# Patient Record
Sex: FEMALE | Race: WHITE | NOT HISPANIC OR LATINO | URBAN - METROPOLITAN AREA
[De-identification: names, ages, dates, MRNs, and addresses within clinical notes are randomized per-mention and may not be internally consistent; named-entity substitution may affect disease eponyms.]

---

## 2023-03-22 RX ORDER — ESCITALOPRAM OXALATE 10 MG/1
10 TABLET ORAL DAILY
COMMUNITY

## 2023-03-22 RX ORDER — MULTIVIT-MIN/IRON/FOLIC/HRB186 3.3 MG-25
TABLET ORAL
COMMUNITY

## 2023-03-28 ENCOUNTER — ANESTHESIA EVENT (OUTPATIENT)
Dept: SURGERY | Facility: AMBULATORY SURGERY CENTER | Age: 38
End: 2023-03-28

## 2023-03-28 NOTE — ANESTHESIA PREPROCEDURE EVALUATION
Anesthesia Pre-Procedure Evaluation    Patient: Arianna Owen   MRN: [id number] : 1985        Procedure : Procedure(s):  BREAST AUGMENTATION          History reviewed. No pertinent past medical history.   Past Surgical History:   Procedure Laterality Date     GYN SURGERY       ORTHOPEDIC SURGERY       WISDOM TOOTH EXTRACTION        No Known Allergies   Social History     Tobacco Use     Smoking status: Some Days     Types: Cigarettes     Smokeless tobacco: Never   Substance Use Topics     Alcohol use: Yes     Comment: weekends      Wt Readings from Last 1 Encounters:   23 59 kg (130 lb)           Physical Exam    Airway        Mallampati: I   TM distance: > 3 FB   Neck ROM: full   Mouth opening: > 3 cm    Respiratory Devices and Support         Dental           Cardiovascular   cardiovascular exam normal          Pulmonary   pulmonary exam normal                OUTSIDE LABS:  CBC: No results found for: WBC, HGB, HCT, PLT  BMP: No results found for: NA, POTASSIUM, CHLORIDE, CO2, BUN, CR, GLC  COAGS: No results found for: PTT, INR, FIBR  POC: No results found for: BGM, HCG, HCGS  HEPATIC: No results found for: ALBUMIN, PROTTOTAL, ALT, AST, GGT, ALKPHOS, BILITOTAL, BILIDIRECT, FRANK  OTHER: No results found for: PH, LACT, A1C, SAEID, PHOS, MAG, LIPASE, AMYLASE, TSH, T4, T3, CRP, SED    Anesthesia Plan    ASA Status:  1   NPO Status:  NPO Appropriate    Anesthesia Type: General.     - Airway: ETT   Induction: Intravenous, Propofol.   Maintenance: Balanced.        Consents    Anesthesia Plan(s) and associated risks, benefits, and realistic alternatives discussed. Questions answered and patient/representative(s) expressed understanding.    - Discussed:     - Discussed with:  Patient, Other (See Comment) (fiance)      - Extended Intubation/Ventilatory Support Discussed: No.      - Patient is DNR/DNI Status: No    Use of blood products discussed: No .     Postoperative Care    Pain management: IV analgesics, Oral  pain medications, Multi-modal analgesia.   PONV prophylaxis: Ondansetron (or other 5HT-3), Dexamethasone or Solumedrol, Background Propofol Infusion     Comments:                Jese Garber MD

## 2023-03-29 ENCOUNTER — HOSPITAL ENCOUNTER (OUTPATIENT)
Facility: AMBULATORY SURGERY CENTER | Age: 38
Discharge: HOME | End: 2023-03-29
Attending: PLASTIC SURGERY | Admitting: PLASTIC SURGERY

## 2023-03-29 ENCOUNTER — ANESTHESIA (OUTPATIENT)
Dept: SURGERY | Facility: AMBULATORY SURGERY CENTER | Age: 38
End: 2023-03-29

## 2023-03-29 VITALS
DIASTOLIC BLOOD PRESSURE: 88 MMHG | SYSTOLIC BLOOD PRESSURE: 135 MMHG | BODY MASS INDEX: 23.04 KG/M2 | TEMPERATURE: 97.4 F | HEART RATE: 70 BPM | RESPIRATION RATE: 13 BRPM | HEIGHT: 63 IN | WEIGHT: 130 LBS | OXYGEN SATURATION: 96 %

## 2023-03-29 DIAGNOSIS — R11.0 NAUSEA AFTER ANESTHESIA, INITIAL ENCOUNTER: ICD-10-CM

## 2023-03-29 DIAGNOSIS — T88.59XA NAUSEA AFTER ANESTHESIA, INITIAL ENCOUNTER: ICD-10-CM

## 2023-03-29 DIAGNOSIS — R52 PAIN: Primary | ICD-10-CM

## 2023-03-29 LAB — HCG UR QL: NEGATIVE

## 2023-03-29 PROCEDURE — G8916 PT W IV AB GIVEN ON TIME: HCPCS

## 2023-03-29 PROCEDURE — G8907 PT DOC NO EVENTS ON DISCHARG: HCPCS

## 2023-03-29 PROCEDURE — L8600 IMPLANT BREAST SILICONE/EQ: HCPCS

## 2023-03-29 PROCEDURE — 81025 URINE PREGNANCY TEST: CPT | Performed by: STUDENT IN AN ORGANIZED HEALTH CARE EDUCATION/TRAINING PROGRAM

## 2023-03-29 PROCEDURE — 360N000089 HC SURGERY LEVEL COSMETIC 120 MIN

## 2023-03-29 DEVICE — SILICONE GEL BREAST IMPLANT, SMOOTH ROUND, HIGH PROFILE, 440 CC
Type: IMPLANTABLE DEVICE | Site: BREAST | Status: FUNCTIONAL
Brand: SIENTRA SILICONE GEL BREAST IMPLANT

## 2023-03-29 RX ORDER — GLYCOPYRROLATE 0.2 MG/ML
INJECTION, SOLUTION INTRAMUSCULAR; INTRAVENOUS PRN
Status: DISCONTINUED | OUTPATIENT
Start: 2023-03-29 | End: 2023-03-29

## 2023-03-29 RX ORDER — HYDROXYZINE PAMOATE 25 MG/1
25 CAPSULE ORAL EVERY 6 HOURS PRN
Qty: 30 CAPSULE | Refills: 0
Start: 2023-03-29

## 2023-03-29 RX ORDER — ONDANSETRON 4 MG/1
4 TABLET, ORALLY DISINTEGRATING ORAL EVERY 8 HOURS PRN
Qty: 4 TABLET | Refills: 0
Start: 2023-03-29

## 2023-03-29 RX ORDER — LIDOCAINE HYDROCHLORIDE 20 MG/ML
INJECTION, SOLUTION INFILTRATION; PERINEURAL PRN
Status: DISCONTINUED | OUTPATIENT
Start: 2023-03-29 | End: 2023-03-29

## 2023-03-29 RX ORDER — ONDANSETRON 2 MG/ML
4 INJECTION INTRAMUSCULAR; INTRAVENOUS EVERY 30 MIN PRN
Status: DISCONTINUED | OUTPATIENT
Start: 2023-03-29 | End: 2023-03-30 | Stop reason: HOSPADM

## 2023-03-29 RX ORDER — PROPOFOL 10 MG/ML
INJECTION, EMULSION INTRAVENOUS CONTINUOUS PRN
Status: DISCONTINUED | OUTPATIENT
Start: 2023-03-29 | End: 2023-03-29

## 2023-03-29 RX ORDER — SODIUM CHLORIDE, SODIUM LACTATE, POTASSIUM CHLORIDE, CALCIUM CHLORIDE 600; 310; 30; 20 MG/100ML; MG/100ML; MG/100ML; MG/100ML
INJECTION, SOLUTION INTRAVENOUS CONTINUOUS
Status: DISCONTINUED | OUTPATIENT
Start: 2023-03-29 | End: 2023-03-30 | Stop reason: HOSPADM

## 2023-03-29 RX ORDER — LIDOCAINE 40 MG/G
CREAM TOPICAL
Status: DISCONTINUED | OUTPATIENT
Start: 2023-03-29 | End: 2023-03-30 | Stop reason: HOSPADM

## 2023-03-29 RX ORDER — DIAZEPAM 5 MG
10 TABLET ORAL EVERY 12 HOURS PRN
Status: DISCONTINUED | OUTPATIENT
Start: 2023-03-29 | End: 2023-03-30 | Stop reason: HOSPADM

## 2023-03-29 RX ORDER — ONDANSETRON 4 MG/1
4 TABLET, ORALLY DISINTEGRATING ORAL EVERY 30 MIN PRN
Status: DISCONTINUED | OUTPATIENT
Start: 2023-03-29 | End: 2023-03-30 | Stop reason: HOSPADM

## 2023-03-29 RX ORDER — GABAPENTIN 300 MG/1
300 CAPSULE ORAL
Status: COMPLETED | OUTPATIENT
Start: 2023-03-29 | End: 2023-03-29

## 2023-03-29 RX ORDER — OXYCODONE AND ACETAMINOPHEN 5; 325 MG/1; MG/1
2 TABLET ORAL
Status: DISCONTINUED | OUTPATIENT
Start: 2023-03-29 | End: 2023-03-30 | Stop reason: HOSPADM

## 2023-03-29 RX ORDER — FENTANYL CITRATE 50 UG/ML
INJECTION, SOLUTION INTRAMUSCULAR; INTRAVENOUS PRN
Status: DISCONTINUED | OUTPATIENT
Start: 2023-03-29 | End: 2023-03-29

## 2023-03-29 RX ORDER — VALACYCLOVIR HYDROCHLORIDE 1 G/1
2 TABLET, FILM COATED ORAL
COMMUNITY
Start: 2022-04-23

## 2023-03-29 RX ORDER — CEFAZOLIN SODIUM 2 G/100ML
2 INJECTION, SOLUTION INTRAVENOUS
Status: COMPLETED | OUTPATIENT
Start: 2023-03-29 | End: 2023-03-29

## 2023-03-29 RX ORDER — OXYCODONE HYDROCHLORIDE 5 MG/1
10 TABLET ORAL
Status: DISCONTINUED | OUTPATIENT
Start: 2023-03-29 | End: 2023-03-30 | Stop reason: HOSPADM

## 2023-03-29 RX ORDER — DEXAMETHASONE SODIUM PHOSPHATE 4 MG/ML
10 INJECTION, SOLUTION INTRA-ARTICULAR; INTRALESIONAL; INTRAMUSCULAR; INTRAVENOUS; SOFT TISSUE
Status: COMPLETED | OUTPATIENT
Start: 2023-03-29 | End: 2023-03-29

## 2023-03-29 RX ORDER — BUPIVACAINE HYDROCHLORIDE AND EPINEPHRINE 2.5; 5 MG/ML; UG/ML
INJECTION, SOLUTION INFILTRATION; PERINEURAL PRN
Status: DISCONTINUED | OUTPATIENT
Start: 2023-03-29 | End: 2023-03-29 | Stop reason: HOSPADM

## 2023-03-29 RX ORDER — OXYCODONE AND ACETAMINOPHEN 5; 325 MG/1; MG/1
1-2 TABLET ORAL EVERY 4 HOURS PRN
Status: DISCONTINUED | OUTPATIENT
Start: 2023-03-29 | End: 2023-03-30 | Stop reason: HOSPADM

## 2023-03-29 RX ORDER — ACETAMINOPHEN 325 MG/1
975 TABLET ORAL ONCE
Status: COMPLETED | OUTPATIENT
Start: 2023-03-29 | End: 2023-03-29

## 2023-03-29 RX ORDER — ONDANSETRON 2 MG/ML
INJECTION INTRAMUSCULAR; INTRAVENOUS PRN
Status: DISCONTINUED | OUTPATIENT
Start: 2023-03-29 | End: 2023-03-29

## 2023-03-29 RX ORDER — FENTANYL CITRATE 50 UG/ML
25 INJECTION, SOLUTION INTRAMUSCULAR; INTRAVENOUS EVERY 5 MIN PRN
Status: DISCONTINUED | OUTPATIENT
Start: 2023-03-29 | End: 2023-03-30 | Stop reason: HOSPADM

## 2023-03-29 RX ORDER — ACETAMINOPHEN 325 MG/1
325-650 TABLET ORAL EVERY 6 HOURS PRN
COMMUNITY

## 2023-03-29 RX ORDER — CEFADROXIL 500 MG/1
500 CAPSULE ORAL EVERY 12 HOURS SCHEDULED
Status: DISCONTINUED | OUTPATIENT
Start: 2023-03-29 | End: 2023-03-30 | Stop reason: HOSPADM

## 2023-03-29 RX ORDER — FENTANYL CITRATE 50 UG/ML
50 INJECTION, SOLUTION INTRAMUSCULAR; INTRAVENOUS EVERY 5 MIN PRN
Status: DISCONTINUED | OUTPATIENT
Start: 2023-03-29 | End: 2023-03-30 | Stop reason: HOSPADM

## 2023-03-29 RX ORDER — HYDROXYZINE HYDROCHLORIDE 25 MG/1
25 TABLET, FILM COATED ORAL
Status: DISCONTINUED | OUTPATIENT
Start: 2023-03-29 | End: 2023-03-30 | Stop reason: HOSPADM

## 2023-03-29 RX ORDER — ONDANSETRON 4 MG/1
4 TABLET, ORALLY DISINTEGRATING ORAL
Status: DISCONTINUED | OUTPATIENT
Start: 2023-03-29 | End: 2023-03-30 | Stop reason: HOSPADM

## 2023-03-29 RX ORDER — OXYCODONE HYDROCHLORIDE 5 MG/1
5 TABLET ORAL
Status: COMPLETED | OUTPATIENT
Start: 2023-03-29 | End: 2023-03-29

## 2023-03-29 RX ADMIN — GLYCOPYRROLATE 0.1 MG: 0.2 INJECTION, SOLUTION INTRAMUSCULAR; INTRAVENOUS at 07:59

## 2023-03-29 RX ADMIN — ACETAMINOPHEN 975 MG: 325 TABLET ORAL at 06:35

## 2023-03-29 RX ADMIN — OXYCODONE HYDROCHLORIDE 5 MG: 5 TABLET ORAL at 09:43

## 2023-03-29 RX ADMIN — DEXAMETHASONE SODIUM PHOSPHATE 10 MG: 4 INJECTION, SOLUTION INTRA-ARTICULAR; INTRALESIONAL; INTRAMUSCULAR; INTRAVENOUS; SOFT TISSUE at 07:44

## 2023-03-29 RX ADMIN — FENTANYL CITRATE 50 MCG: 50 INJECTION, SOLUTION INTRAMUSCULAR; INTRAVENOUS at 07:28

## 2023-03-29 RX ADMIN — PROPOFOL 20 MG: 10 INJECTION, EMULSION INTRAVENOUS at 09:18

## 2023-03-29 RX ADMIN — GABAPENTIN 300 MG: 300 CAPSULE ORAL at 06:35

## 2023-03-29 RX ADMIN — Medication 40 MG: at 07:28

## 2023-03-29 RX ADMIN — Medication 10 MG: at 08:07

## 2023-03-29 RX ADMIN — PROPOFOL 50 MCG/KG/MIN: 10 INJECTION, EMULSION INTRAVENOUS at 07:36

## 2023-03-29 RX ADMIN — GLYCOPYRROLATE 0.1 MG: 0.2 INJECTION, SOLUTION INTRAMUSCULAR; INTRAVENOUS at 08:04

## 2023-03-29 RX ADMIN — CEFAZOLIN SODIUM 2 G: 2 INJECTION, SOLUTION INTRAVENOUS at 07:23

## 2023-03-29 RX ADMIN — SODIUM CHLORIDE, SODIUM LACTATE, POTASSIUM CHLORIDE, CALCIUM CHLORIDE: 600; 310; 30; 20 INJECTION, SOLUTION INTRAVENOUS at 06:45

## 2023-03-29 RX ADMIN — ONDANSETRON 4 MG: 2 INJECTION INTRAMUSCULAR; INTRAVENOUS at 07:52

## 2023-03-29 RX ADMIN — Medication 10 MG: at 08:21

## 2023-03-29 RX ADMIN — FENTANYL CITRATE 50 MCG: 50 INJECTION, SOLUTION INTRAMUSCULAR; INTRAVENOUS at 07:47

## 2023-03-29 RX ADMIN — LIDOCAINE HYDROCHLORIDE 60 MG: 20 INJECTION, SOLUTION INFILTRATION; PERINEURAL at 07:28

## 2023-03-29 NOTE — ANESTHESIA PROCEDURE NOTES
Airway       Patient location during procedure: OR       Procedure Start/Stop Times: 3/29/2023 7:30 AM  Staff -        CRNA: Angela Spencer APRN CRNA       Performed By: CRNA  Consent for Airway        Urgency: elective  Indications and Patient Condition       Indications for airway management: castro-procedural       Induction type:intravenous       Mask difficulty assessment: 0 - not attempted    Final Airway Details       Final airway type: endotracheal airway       Successful airway: ETT - single  Endotracheal Airway Details        ETT size (mm): 6.5       Cuffed: yes       Successful intubation technique: direct laryngoscopy       DL Blade Type: Lynch 2       Grade View of Cords: 1       Adjucts: stylet       Position: Right       Measured from: lips       Secured at (cm): 21       Bite block used: None    Post intubation assessment        Placement verified by: capnometry, equal breath sounds and chest rise        Number of attempts at approach: 1       Secured with: pink tape       Ease of procedure: easy       Dentition: Intact    Medication(s) Administered   Medication Administration Time: 3/29/2023 7:30 AM

## 2023-03-29 NOTE — ANESTHESIA CARE TRANSFER NOTE
Patient: Arianna Owen    Procedure: Procedure(s):  BREAST AUGMENTATION       Diagnosis: Encounter for cosmetic surgery [Z41.1]  Diagnosis Additional Information: No value filed.    Anesthesia Type:   General     Note:    Oropharynx: oral airway in place  Level of Consciousness: drowsy  Oxygen Supplementation: nasal cannula  Level of Supplemental Oxygen (L/min / FiO2): 2  Independent Airway: airway patency satisfactory and stable  Dentition: dentition unchanged  Vital Signs Stable: post-procedure vital signs reviewed and stable  Report to RN Given: handoff report given  Patient transferred to: PACU    Handoff Report: Identifed the Patient, Identified the Reponsible Provider, Reviewed the pertinent medical history, Discussed the surgical course, Reviewed Intra-OP anesthesia mangement and issues during anesthesia, Set expectations for post-procedure period and Allowed opportunity for questions and acknowledgement of understanding      Vitals:  Vitals Value Taken Time   /76    Temp 97.4    Pulse 68    Resp 12    SpO2 98        Electronically Signed By: GARETH Wolff CRNA  March 29, 2023  9:33 AM

## 2023-03-29 NOTE — ANESTHESIA POSTPROCEDURE EVALUATION
Patient: Arianna Owen    Procedure: Procedure(s):  BREAST AUGMENTATION       Anesthesia Type:  General    Note:  Disposition: Outpatient   Postop Pain Control: Uneventful            Sign Out: Well controlled pain   PONV:    Neuro/Psych: Uneventful            Sign Out: Acceptable/Baseline neuro status   Airway/Respiratory: Uneventful            Sign Out: Acceptable/Baseline resp. status   CV/Hemodynamics: Uneventful            Sign Out: Acceptable CV status; No obvious hypovolemia; No obvious fluid overload   Other NRE:    DID A NON-ROUTINE EVENT OCCUR?            Last vitals:  Vitals Value Taken Time   /84 03/29/23 1000   Temp 97.4  F (36.3  C) 03/29/23 0929   Pulse 73 03/29/23 1001   Resp 12 03/29/23 1001   SpO2 97 % 03/29/23 1001   Vitals shown include unvalidated device data.    Electronically Signed By: Jese Garber MD  March 29, 2023  10:57 AM

## 2023-03-29 NOTE — DISCHARGE INSTRUCTIONS
[facility]   Adult Discharge Orders & Instructions     For 24 hours after surgery    Get plenty of rest.  A responsible adult must stay with you for at least 24 hours after you leave the hospital.   Do not drive or use heavy equipment.  If you have weakness or tingling, don't drive or use heavy equipment until this feeling goes away.  Do not drink alcohol.  Avoid strenuous or risky activities.  Ask for help when climbing stairs.   You may feel lightheaded.  IF so, sit for a few minutes before standing.  Have someone help you get up.   If you have nausea (feel sick to your stomach): Drink only clear liquids such as apple juice, ginger ale, broth or 7-Up.  Rest may also help.  Be sure to drink enough fluids.  Move to a regular diet as you feel able.  You may have a slight fever. Call the doctor if your fever is over 100 F (37.7 C) (taken under the tongue) or lasts longer than 24 hours.  You may have a dry mouth, a sore throat, muscle aches or trouble sleeping.  These should go away after 24 hours.  Do not make important or legal decisions.     Call your doctor for any of the followin.  Signs of infection (fever, growing tenderness at the surgery site, a large amount of drainage or bleeding, severe pain, foul-smelling drainage, redness, swelling).    2. It has been over 8 to 10 hours since surgery and you are still not able to urinate (pass water).    3.  Headache for over 24 hours.    4.  Numbness, tingling or weakness the day after surgery (if you had spinal anesthesia).                  5. Signs of Covid-19 infection (temperature over 100 degrees, shortness of breath, cough, loss of taste/smell, generalized body aches, persistent headache,                  chills, sore throat, nausea/vomiting/diarrhea).    To contact Dr Garcia call:    [phone] - Day  [phone] - After hours pager

## 2023-03-30 NOTE — BRIEF OP NOTE
[facility] Brief Operative Note    Pre-operative diagnosis: Hypomastia and postpartum involution   Post-operative diagnosis same   Procedure: Procedure(s):  BREAST AUGMENTATION   Surgeon(s): Surgeon(s) and Role:     * Miguel Wang MD - Primary   Estimated blood loss: minimal    Specimens: * No specimens in log *   Findings: none   Condition: extubated and stable to PAR    Miguel Wang MD

## 2023-04-03 NOTE — OP NOTE
PREOPERATIVE DIAGNOSES:   1.  Bilateral hypomastia.   2.  Postpartum involution.      POSTOPERATIVE DIAGNOSES:     1.  Bilateral hypomastia.   2.  Postpartum involution.      PROCEDURES PERFORMED:  Bilateral augmentation mammoplasty through an inframammary fold incision with the implant in a submuscular position.      IMPLANTS:     1.  Left breast implant: Sientra HSC smooth round high profile, high strength cohesive gel breast implant.  Reference number [id number], serial number is [id number].   2.  Right breast implant:  Sientra HSC smooth round high profile cohesive gel breast implant, reference number  [id number], serial number is [id number].      SURGEON:  Miguel Wang MD      ANESTHETIC:  General, utilizing a laryngeal masked airway.      INDICATIONS FOR PROCEDURE:  The patient is a very pleasant, attractive 38-year-old female who has seen us in consultation regarding breast augmentation.  The patient understands that breast implants are not considered a lifetime medical device and she will have to replace them within her lifetime.  The current generation of Sientra gel breast implants do carry a lifetime warranty.  The patient understands there is a phenomenon known as silent rupture where the implant may fail and neither the surgeon or patient are aware of this.  I recommend MRI  or Ultrasound  surveillance for silent rupture  per Sientra guidelines.       The patient understands there is a phenomenon known as capsular contracture where the body may form an aggressive scar capsule around the breast implant.  The rate of capsular contracture in my practice is approximately 2% when implants placed in submuscular position through an inframammary fold incision as used for the case for this patient.  The patient was told there are 4 maneuvers I utilize to diminish her chances of capsular contracture.  The first is submuscular breast implant placement.  Submuscular breast implant placement has been shown to  confer a 3-4 fold decrease in rate of capsular contracture compared with subglandular breast augmentation.  Subglandular breast augmentation carries a capsular contracture rate in the 12% to 16% range, and this can be lowered to the 3% to 4% by placing the implant in a submuscular position.  Use of an inframammary fold incision for placement of the implant has been shown to confer a 10 fold reduction of capsular contracture in clinical studies performed by Horacio Vallecillo MD from [facility].  In his study, the rate of capsular contracture was 9.5% when the implant is placed through a periareolar incision.  This was lowered to 0.59% when an inframammary fold incision was utilized for placing the implant.  Use of breast pocket irrigations consisting of Ancef, gentamicin and bacitracin has been shown to decrease the rate of capsular contracture in clinical studies performed by Filippo Leiva MD from the [facility].  The last maneuver I utilized is the use of a Zamorano funnel for implant placement.  A Zamorano funnel allows for a no-touch technique, which theoretically should decrease the chances of developing a biofilm and therefore capsular contracture.  The patient is 38 years of age.  She had a clear mammogram prior to surgery.  The patient understands that she needs to tell her mammographer she has breast implants.  Special mammographic views known as Hari views are used to best visualize the breasts following augmentation mammoplasty.  The patient was told there is a condition as breast implant-associated lymphoma.  Approximately 400-500 cases have been reported in worldwide literature to date and all have been shown to occur in patients where the surgeon and patient chose to use a textured implant.  In my practice, dating to 1998, I have never used textured implants.  I only use smooth breast implants in my practice.  There are no cases of breast implant-associated lymphoma  which have occurred with use of smooth round breast implants.  The patient understands her incision will be in the inframammary fold.  She understands the risks of the operation include first and foremost the chance of capsular contracture, followed by implant malposition, asymmetry, hypertrophic scarring and possible dissatisfaction with cosmetic outcome.  Complications such as bleeding and infection are extremely rare in my practice.  I have had 1 postoperative bleed and 1 single cosmetic breast augmentation become infected in my practice dating to 1994.  The patient has been given a chance to ask questions and all were answered.  The patient provides her informed consent for the procedure.      DESCRIPTION OF PROCEDURE AND FINDINGS:  In the preoperative holding area, the inframammary fold incision was marked and consent was obtained.  This consent is in addition to our in-office consent she signed at her preoperative evaluation.  The patient was taken to the operating room, placed in supine position on the operating room table.  A successful general anesthetic was induced using a laryngeal mask airway.  The patient received 2 grams of Ancef and 10 mg of Decadron intravenously prior to commencing with surgery.  Her chest was then prepped and draped in routine sterile fashion.  The infra-areolar complexes were covered with 3M Tegaderm dressings to drape them completely out of the sterile field.    Incision was made in the right inframammary fold.  Incision was carried through the dermis down to the pectoralis major muscle.  I then switched to a guarded blade tip Bovie electrocautery.  I dissected several centimeters above the inframammary fold on the clavipectoral fascia.  Then, using a Carrie retractor, I established a submuscular plane.  The muscle was partially released from 3 o'clock to 6 o'clock on the right side.  Intercostal perforating vessels were cauterized using an insulated DeBakey type monopolar forceps  as I came upon them.  Finger dissection was used laterally to avoid any traction on the fourth intercostal nerve.  Superiorly, dissection was made between the pectoralis major and pectoralis minor muscles in gentle sweeping fashion using a uterine sound.  A sizer was placed and filled with air, which gave nice shape to her breast.  This also identified areas where further attenuation of the medial fibers of the pectoralis major was needed and this was accomplished surgically.  The right breast pocket was irrigated with solution consisting of 1 gram of Ancef, 80 mg of gentamicin and bacitracin in injectable saline.  This was done in accordance with studies by Filippo Leiva MD, from the [facility] as a clinically proven means of diminishing the chances of capsular contracture.  The dissection pocket was then injected with 0.25% Marcaine with Kenalog for postoperative analgesia and for the anti-inflammatory properties of steroids.      Attention was then directed to the left side, where the identical operation was performed.  On the left side, the muscle was partially released from 6 o'clock to 9 o'clock.  Again, a sizer was placed and filled with air, which gave nice shape to her breast.  This identified areas were further attenuation of the medial fibers of pectoralis major was needed and this was accomplished.  All perforating vessels were cauterized using an awl medially based.  Perforating vessels were cauterized using an insulated bayonet style guarded monopolar forceps.  Hemostasis was excellent.  The left breast pocket was irrigated with the Ancef, gentamicin, bacitracin solution.  At this point, all operative personnel changed their gloves.  The implants were opened on the back table and soaked in the antibiotic solution.  A Zamorano funnel was opened and trimmed to the appropriate size for a 440 mL implant.  The funnel was lubricated with the antibiotic solution.  The implant was  transferred to the Zamorano funnel and then the Zamorano funnel was utilized to insert the breast implant in the right breast pocket utilizing a no-touch technique.  Closure consisted of 3-0 Vicryl sutures in the muscular layer.  Deep parenchymal layer was closed using 4-0 PDS in buried and interrupted deep dermal fashion.  The skin of the hiatal fold incision was closed using 4-0 Monocryl suture in running and buried intracuticular fashion.  The skin was then run using 4-0 Prolene suture in running and buried continuous intracuticular fashion.  The inframammary fold incision was dressed using Mastisol and 3M Steri-Strips.      Attention was then directed to the left side.  The left side had been irrigated with the Ancef, gentamicin and bacitracin solution and injected with 0.25% Marcaine with 1:100,000 epinephrine for analgesia.  At this point, all operative personnel changed their gloves.  The implant was soaked in the antibiotic solution.  The Zamorano funnel was lubricated with antibiotic solution.  The implant was transferred from its sterile container to the Zamorano funnel.  The implant was inserted into the pocket utilizing a no-touch technique.  Closure was identical from left to right.  Dressings were identical from left to right. Dressing of the inframammary fold incision consisted of Mastisol and 3M 1/2-inch Steri-Strips over the inframammary fold incision.  ESTIMATED BLOOD LOSS:  Minimal.  COMPLICATIONS:  None.  STABLE TO FANY Wang MD

## 2023-04-07 NOTE — H&P
No changes to H&P completed on 3/22/23 for history. Patient underwent Breast Augmentation on 3/29/23 and today  developed a hematoma on left side. Planned evacuation of this on 4/10/23

## 2023-04-10 ENCOUNTER — HOSPITAL ENCOUNTER (OUTPATIENT)
Facility: AMBULATORY SURGERY CENTER | Age: 38
Discharge: HOME | End: 2023-04-10
Attending: PLASTIC SURGERY | Admitting: PLASTIC SURGERY

## 2023-04-10 ENCOUNTER — ANESTHESIA (OUTPATIENT)
Dept: SURGERY | Facility: AMBULATORY SURGERY CENTER | Age: 38
End: 2023-04-10

## 2023-04-10 ENCOUNTER — ANESTHESIA EVENT (OUTPATIENT)
Dept: SURGERY | Facility: AMBULATORY SURGERY CENTER | Age: 38
End: 2023-04-10

## 2023-04-10 VITALS
SYSTOLIC BLOOD PRESSURE: 145 MMHG | OXYGEN SATURATION: 98 % | RESPIRATION RATE: 16 BRPM | BODY MASS INDEX: 23.03 KG/M2 | HEART RATE: 80 BPM | WEIGHT: 130 LBS | TEMPERATURE: 98.3 F | DIASTOLIC BLOOD PRESSURE: 92 MMHG

## 2023-04-10 DIAGNOSIS — R52 PAIN: ICD-10-CM

## 2023-04-10 DIAGNOSIS — T88.59XA NAUSEA AFTER ANESTHESIA, INITIAL ENCOUNTER: Primary | ICD-10-CM

## 2023-04-10 DIAGNOSIS — R11.0 NAUSEA AFTER ANESTHESIA, INITIAL ENCOUNTER: Primary | ICD-10-CM

## 2023-04-10 PROCEDURE — 360N000058 HC SURGERY LEVEL COSMETIC EACH ADDL 30 MIN OVER QUOTE ESTIMATE

## 2023-04-10 PROCEDURE — G8907 PT DOC NO EVENTS ON DISCHARG: HCPCS

## 2023-04-10 PROCEDURE — 360N000050 HC SURGERY LEVEL COSMETIC 30 MIN

## 2023-04-10 PROCEDURE — G8916 PT W IV AB GIVEN ON TIME: HCPCS

## 2023-04-10 RX ORDER — ONDANSETRON 2 MG/ML
INJECTION INTRAMUSCULAR; INTRAVENOUS PRN
Status: DISCONTINUED | OUTPATIENT
Start: 2023-04-10 | End: 2023-04-10

## 2023-04-10 RX ORDER — CEFAZOLIN SODIUM 2 G/100ML
2 INJECTION, SOLUTION INTRAVENOUS
Status: COMPLETED | OUTPATIENT
Start: 2023-04-10 | End: 2023-04-10

## 2023-04-10 RX ORDER — BUPIVACAINE HYDROCHLORIDE AND EPINEPHRINE 2.5; 5 MG/ML; UG/ML
INJECTION, SOLUTION INFILTRATION; PERINEURAL PRN
Status: DISCONTINUED | OUTPATIENT
Start: 2023-04-10 | End: 2023-04-10 | Stop reason: HOSPADM

## 2023-04-10 RX ORDER — PROPOFOL 10 MG/ML
INJECTION, EMULSION INTRAVENOUS CONTINUOUS PRN
Status: DISCONTINUED | OUTPATIENT
Start: 2023-04-10 | End: 2023-04-10

## 2023-04-10 RX ORDER — ONDANSETRON 4 MG/1
4 TABLET, ORALLY DISINTEGRATING ORAL
Status: DISCONTINUED | OUTPATIENT
Start: 2023-04-10 | End: 2023-04-12 | Stop reason: HOSPADM

## 2023-04-10 RX ORDER — PROPOFOL 10 MG/ML
INJECTION, EMULSION INTRAVENOUS PRN
Status: DISCONTINUED | OUTPATIENT
Start: 2023-04-10 | End: 2023-04-10

## 2023-04-10 RX ORDER — OXYCODONE AND ACETAMINOPHEN 5; 325 MG/1; MG/1
1 TABLET ORAL EVERY 6 HOURS PRN
COMMUNITY

## 2023-04-10 RX ORDER — OXYCODONE AND ACETAMINOPHEN 5; 325 MG/1; MG/1
2 TABLET ORAL
Status: DISCONTINUED | OUTPATIENT
Start: 2023-04-10 | End: 2023-04-12 | Stop reason: HOSPADM

## 2023-04-10 RX ORDER — LIDOCAINE HYDROCHLORIDE 20 MG/ML
INJECTION, SOLUTION INFILTRATION; PERINEURAL PRN
Status: DISCONTINUED | OUTPATIENT
Start: 2023-04-10 | End: 2023-04-10

## 2023-04-10 RX ORDER — HYDROXYZINE HYDROCHLORIDE 25 MG/1
25 TABLET, FILM COATED ORAL
Status: DISCONTINUED | OUTPATIENT
Start: 2023-04-10 | End: 2023-04-12 | Stop reason: HOSPADM

## 2023-04-10 RX ORDER — HYDROXYZINE PAMOATE 25 MG/1
25 CAPSULE ORAL EVERY 6 HOURS PRN
Qty: 30 CAPSULE | Refills: 0
Start: 2023-04-10

## 2023-04-10 RX ORDER — DIAZEPAM 10 MG
10 TABLET ORAL EVERY 6 HOURS PRN
COMMUNITY

## 2023-04-10 RX ORDER — OXYCODONE HYDROCHLORIDE 5 MG/1
5 TABLET ORAL EVERY 4 HOURS PRN
Status: DISCONTINUED | OUTPATIENT
Start: 2023-04-10 | End: 2023-04-12 | Stop reason: HOSPADM

## 2023-04-10 RX ORDER — CEFADROXIL 500 MG/1
500 CAPSULE ORAL EVERY 12 HOURS SCHEDULED
Status: DISCONTINUED | OUTPATIENT
Start: 2023-04-10 | End: 2023-04-12 | Stop reason: HOSPADM

## 2023-04-10 RX ORDER — DEXAMETHASONE SODIUM PHOSPHATE 4 MG/ML
10 INJECTION, SOLUTION INTRA-ARTICULAR; INTRALESIONAL; INTRAMUSCULAR; INTRAVENOUS; SOFT TISSUE
Status: COMPLETED | OUTPATIENT
Start: 2023-04-10 | End: 2023-04-10

## 2023-04-10 RX ORDER — FENTANYL CITRATE 50 UG/ML
25-50 INJECTION, SOLUTION INTRAMUSCULAR; INTRAVENOUS EVERY 5 MIN PRN
Status: DISCONTINUED | OUTPATIENT
Start: 2023-04-10 | End: 2023-04-12 | Stop reason: HOSPADM

## 2023-04-10 RX ORDER — FENTANYL CITRATE 50 UG/ML
INJECTION, SOLUTION INTRAMUSCULAR; INTRAVENOUS PRN
Status: DISCONTINUED | OUTPATIENT
Start: 2023-04-10 | End: 2023-04-10

## 2023-04-10 RX ORDER — LIDOCAINE 40 MG/G
CREAM TOPICAL
Status: DISCONTINUED | OUTPATIENT
Start: 2023-04-10 | End: 2023-04-12 | Stop reason: HOSPADM

## 2023-04-10 RX ORDER — ONDANSETRON 4 MG/1
4 TABLET, ORALLY DISINTEGRATING ORAL EVERY 8 HOURS PRN
Qty: 4 TABLET | Refills: 0
Start: 2023-04-10

## 2023-04-10 RX ORDER — SODIUM CHLORIDE, SODIUM LACTATE, POTASSIUM CHLORIDE, CALCIUM CHLORIDE 600; 310; 30; 20 MG/100ML; MG/100ML; MG/100ML; MG/100ML
INJECTION, SOLUTION INTRAVENOUS CONTINUOUS
Status: DISCONTINUED | OUTPATIENT
Start: 2023-04-10 | End: 2023-04-12 | Stop reason: HOSPADM

## 2023-04-10 RX ORDER — OXYCODONE AND ACETAMINOPHEN 5; 325 MG/1; MG/1
1-2 TABLET ORAL EVERY 4 HOURS PRN
Status: DISCONTINUED | OUTPATIENT
Start: 2023-04-10 | End: 2023-04-12 | Stop reason: HOSPADM

## 2023-04-10 RX ORDER — HYDROCODONE BITARTRATE AND ACETAMINOPHEN 5; 325 MG/1; MG/1
1 TABLET ORAL EVERY 6 HOURS PRN
COMMUNITY

## 2023-04-10 RX ADMIN — FENTANYL CITRATE 25 MCG: 50 INJECTION, SOLUTION INTRAMUSCULAR; INTRAVENOUS at 16:00

## 2023-04-10 RX ADMIN — PROPOFOL 150 MG: 10 INJECTION, EMULSION INTRAVENOUS at 14:20

## 2023-04-10 RX ADMIN — FENTANYL CITRATE 50 MCG: 50 INJECTION, SOLUTION INTRAMUSCULAR; INTRAVENOUS at 14:39

## 2023-04-10 RX ADMIN — PROPOFOL 200 MCG/KG/MIN: 10 INJECTION, EMULSION INTRAVENOUS at 14:20

## 2023-04-10 RX ADMIN — ONDANSETRON 4 MG: 2 INJECTION INTRAMUSCULAR; INTRAVENOUS at 14:26

## 2023-04-10 RX ADMIN — DEXAMETHASONE SODIUM PHOSPHATE 10 MG: 4 INJECTION, SOLUTION INTRA-ARTICULAR; INTRALESIONAL; INTRAMUSCULAR; INTRAVENOUS; SOFT TISSUE at 14:26

## 2023-04-10 RX ADMIN — OXYCODONE HYDROCHLORIDE 5 MG: 5 TABLET ORAL at 15:49

## 2023-04-10 RX ADMIN — SODIUM CHLORIDE, SODIUM LACTATE, POTASSIUM CHLORIDE, CALCIUM CHLORIDE: 600; 310; 30; 20 INJECTION, SOLUTION INTRAVENOUS at 14:16

## 2023-04-10 RX ADMIN — FENTANYL CITRATE 25 MCG: 50 INJECTION, SOLUTION INTRAMUSCULAR; INTRAVENOUS at 15:19

## 2023-04-10 RX ADMIN — Medication 10 MG: at 14:39

## 2023-04-10 RX ADMIN — FENTANYL CITRATE 25 MCG: 50 INJECTION, SOLUTION INTRAMUSCULAR; INTRAVENOUS at 14:20

## 2023-04-10 RX ADMIN — CEFAZOLIN SODIUM 2 G: 2 INJECTION, SOLUTION INTRAVENOUS at 14:15

## 2023-04-10 RX ADMIN — LIDOCAINE HYDROCHLORIDE 60 MG: 20 INJECTION, SOLUTION INFILTRATION; PERINEURAL at 14:20

## 2023-04-10 NOTE — ANESTHESIA POSTPROCEDURE EVALUATION
Patient: Arianna Owen    Procedure: Procedure(s):  Incision and drainage hematoma/seroma/fluid collection left breast       Anesthesia Type:  General    Note:  Disposition: Outpatient   Postop Pain Control: Uneventful            Sign Out: Well controlled pain   PONV: No   Neuro/Psych: Uneventful            Sign Out: Acceptable/Baseline neuro status   Airway/Respiratory: Uneventful            Sign Out: Acceptable/Baseline resp. status   CV/Hemodynamics: Uneventful            Sign Out: Acceptable CV status; No obvious hypovolemia; No obvious fluid overload   Other NRE: NONE   DID A NON-ROUTINE EVENT OCCUR? No           Last vitals:  Vitals Value Taken Time   /96 04/10/23 1600   Temp 99.3  F (37.4  C) 04/10/23 1538   Pulse 75 04/10/23 1607   Resp 22 04/10/23 1607   SpO2 98 % 04/10/23 1607   Vitals shown include unvalidated device data.    Electronically Signed By: Dimas Camarillo MD  April 10, 2023  4:09 PM

## 2023-04-10 NOTE — ANESTHESIA CARE TRANSFER NOTE
Patient: Arianna Owen    Procedure: Procedure(s):  Incision and drainage hematoma/seroma/fluid collection left breast       Diagnosis: Encounter for cosmetic procedure [Z41.1]  Diagnosis Additional Information: No value filed.    Anesthesia Type:   General     Note:    Oropharynx: oropharynx clear of all foreign objects  Level of Consciousness: awake  Oxygen Supplementation: nasal cannula    Independent Airway: airway patency satisfactory and stable  Dentition: dentition unchanged  Vital Signs Stable: post-procedure vital signs reviewed and stable  Report to RN Given: handoff report given  Patient transferred to: PACU    Handoff Report: Identifed the Patient, Identified the Reponsible Provider, Reviewed the pertinent medical history, Discussed the surgical course, Reviewed Intra-OP anesthesia mangement and issues during anesthesia, Set expectations for post-procedure period and Allowed opportunity for questions and acknowledgement of understanding      Vitals:  Vitals Value Taken Time   /85 04/10/23 1537   Temp     Pulse 104 04/10/23 1537   Resp 14 04/10/23 1539   SpO2 100 % 04/10/23 1539   Vitals shown include unvalidated device data.    Electronically Signed By: GARETH Delgadillo CRNA  April 10, 2023  3:40 PM

## 2023-04-10 NOTE — ANESTHESIA PROCEDURE NOTES
Airway       Patient location during procedure: OR       Procedure Start/Stop Times: 4/10/2023 2:26 PM  Staff -        Performed By: CRNAIndications and Patient Condition       Indications for airway management: castro-procedural       Induction type:intravenous       Mask difficulty assessment: 1 - vent by mask    Final Airway Details       Final airway type: supraglottic airway    Supraglottic Airway Details        Type: LMA       Brand: LMA Unique       LMA size: 4    Post intubation assessment        Placement verified by: capnometry, equal breath sounds and chest rise        Secured with: cloth tape       Ease of procedure: easy       Dentition: Intact    Medication(s) Administered   Medication Administration Time: 4/10/2023 2:26 PM

## 2023-04-10 NOTE — ANESTHESIA PREPROCEDURE EVALUATION
Anesthesia Pre-Procedure Evaluation    Patient: Arianna Owen   MRN: [id number] : 1985        Procedure : Procedure(s):  Incision and drainage hematoma/seroma/fluid collection left breast          History reviewed. No pertinent past medical history.   Past Surgical History:   Procedure Laterality Date     GYN SURGERY       MAMMOPLASTY AUGMENTATION BILATERAL Bilateral 3/29/2023    Procedure: BILATERAL BREAST AUGMENTATION;  Surgeon: Miguel Wang MD;  Location: [facility] OR     ORTHOPEDIC SURGERY       WISDOM TOOTH EXTRACTION        No Known Allergies   Social History     Tobacco Use     Smoking status: Some Days     Types: Cigarettes     Smokeless tobacco: Never   Vaping Use     Vaping status: Not on file   Substance Use Topics     Alcohol use: Yes     Comment: weekends      Wt Readings from Last 1 Encounters:   23 59 kg (130 lb)        Anesthesia Evaluation   Pt has had prior anesthetic. Type: General.    No history of anesthetic complications       ROS/MED HX  ENT/Pulmonary:  - neg pulmonary ROS     Neurologic:  - neg neurologic ROS     Cardiovascular:  - neg cardiovascular ROS     METS/Exercise Tolerance:     Hematologic:  - neg hematologic  ROS     Musculoskeletal:  - neg musculoskeletal ROS     GI/Hepatic:  - neg GI/hepatic ROS     Renal/Genitourinary:  - neg Renal ROS     Endo:  - neg endo ROS     Psychiatric/Substance Use:  - neg psychiatric ROS     Infectious Disease:  - neg infectious disease ROS     Malignancy:  - neg malignancy ROS     Other:  - neg other ROS          Physical Exam    Airway  airway exam normal           Respiratory Devices and Support         Dental    unable to assess        Cardiovascular   cardiovascular exam normal          Pulmonary   pulmonary exam normal                OUTSIDE LABS:  CBC: No results found for: WBC, HGB, HCT, PLT  BMP: No results found for: NA, POTASSIUM, CHLORIDE, CO2, BUN, CR, GLC  COAGS: No results found for: PTT, INR, FIBR  POC:   Lab Results    Component Value Date    HCG Negative 03/29/2023     HEPATIC: No results found for: ALBUMIN, PROTTOTAL, ALT, AST, GGT, ALKPHOS, BILITOTAL, BILIDIRECT, FRANK  OTHER: No results found for: PH, LACT, A1C, SAEID, PHOS, MAG, LIPASE, AMYLASE, TSH, T4, T3, CRP, SED    Anesthesia Plan    ASA Status:  1   NPO Status:  NPO Appropriate    Anesthesia Type: General.     - Airway: LMA   Induction: Intravenous, Propofol.   Maintenance: Balanced.        Consents    Anesthesia Plan(s) and associated risks, benefits, and realistic alternatives discussed. Questions answered and patient/representative(s) expressed understanding.    - Discussed:     - Discussed with:  Patient      - Extended Intubation/Ventilatory Support Discussed: No.      - Patient is DNR/DNI Status: No    Use of blood products discussed: No .     Postoperative Care    Pain management: IV analgesics, Oral pain medications.   PONV prophylaxis: Ondansetron (or other 5HT-3), Background Propofol Infusion     Comments:                Dimas Camarillo MD

## 2023-04-10 NOTE — DISCHARGE INSTRUCTIONS
[facility]   Adult Discharge Orders & Instructions     For 24 hours after surgery    Get plenty of rest.  A responsible adult must stay with you for at least 24 hours after you leave the hospital.   Do not drive or use heavy equipment.  If you have weakness or tingling, don't drive or use heavy equipment until this feeling goes away.  Do not drink alcohol.  Avoid strenuous or risky activities.  Ask for help when climbing stairs.   You may feel lightheaded.  IF so, sit for a few minutes before standing.  Have someone help you get up.   If you have nausea (feel sick to your stomach): Drink only clear liquids such as apple juice, ginger ale, broth or 7-Up.  Rest may also help.  Be sure to drink enough fluids.  Move to a regular diet as you feel able.  You may have a slight fever. Call the doctor if your fever is over 100 F (37.7 C) (taken under the tongue) or lasts longer than 24 hours.  You may have a dry mouth, a sore throat, muscle aches or trouble sleeping.  These should go away after 24 hours.  Do not make important or legal decisions.     Call your doctor for any of the followin.  Signs of infection (fever, growing tenderness at the surgery site, a large amount of drainage or bleeding, severe pain, foul-smelling drainage, redness, swelling).    2. It has been over 8 to 10 hours since surgery and you are still not able to urinate (pass water).    3.  Headache for over 24 hours.    4.  Numbness, tingling or weakness the day after surgery (if you had spinal anesthesia).                  5. Signs of Covid-19 infection (temperature over 100 degrees, shortness of breath, cough, loss of taste/smell, generalized body aches, persistent headache,                  chills, sore throat, nausea/vomiting/diarrhea).

## 2023-04-12 NOTE — BRIEF OP NOTE
[facility] Brief Operative Note    Pre-operative diagnosis: Left breast hematoma   Post-operative diagnosis same   Procedure: Procedure(s):  Incision and drainage hematoma/seroma/fluid collection left breast   Surgeon(s): Surgeon(s) and Role:     * Miguel Wang MD - Primary   Estimated blood loss: 50 mL    Specimens: * No specimens in log *   Findings: hematoma   Condition: extubated and stable to PAR    Miguel Wang MD

## 2023-04-27 NOTE — OP NOTE
Procedure Date: 04/10/2023    PREOPERATIVE DIAGNOSES:    1.  Hematoma of left breast, ICD-10 code N64.89  2.  Other post-procedural complications of skin and subcutaneous tissues.    POSTOPERATIVE DIAGNOSES:    1.  Hematoma of left breast, ICD-10 code N64.89  2.  Other post-procedural complications of skin and subcutaneous tissues.    PROCEDURE:  Evacuation of hematoma of left breast, CPT code 79765M.    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  The operation went very well.  The patient was seen on postop day #1.  The patient is from [place], [place] and had a nursing colleague remove her sutures at 1 week postop.  On 04/07/2023, the patient experienced pain in the morning in her left breast.  [place] [facility] get [place] on the schedule for evacuation of a hematoma.  The patient opted to go to her own hospital where she knows the emergency room doctors and the general surgeons.  She [place] 2.5Hrs away and could not drive herself or get a ride on short notice. The patient was evaluated [facility] Emergency Room where she underwent an ultrasound, which did show a hematoma of the left breast.  She had consultations with both general surgery and interventional radiology, who gave her the opinion that it would be better to let the hematoma simply resorb.  She phoned me to tell me this.  I told her that it was now too late in the day to get her on the schedule, but I could arrange for her to have surgery on Monday, 04/10/2023, at [facility] in [place].  The patient was made n.p.o. after 6 a.m., as the only time to get her on the schedule was 2:00 p.m.    PROCEDURE:  The patient was taken to the operating room and placed in supine position on the operating room table.  A successful general endotracheal anesthetic was then induced.  The patient was prepped  and draped in routine sterile fashion.  Timeout was called at 2:37 p.m.  Procedure started at 2:38 p.m.  Incision was made excising the previous incision, and this was brought down to the level of the capsule.  Hematoma could be seen.  The implant was removed and placed in a basin on the back table with Ancef, gentamicin, bacitracin and Betadine.  I then used a pulse lavage, 3 liters of lactated Ringer's, in which was placed 3 grams of Ancef and 240 mg of gentamicin.  I pulse lavaged all of the old hematoma until the tissues were quite clean of hematoma.  I would estimate that 50 mL of old blood were evacuated prior to pulse lavage.  I did find several small bleeding points at the superomedial aspect of the submuscular breast pocket.  These were cauterized with a monopolar cautery.  Once excellent hemostasis was achieved, I used the remainder of the 3 liters of pulse lavage.  A Zamorano funnel was opened on the back table and trimmed to the appropriate size for the patient's 415 mL Sientra breast implant.  All operative personnel changed their gloves.  The Zamorano funnel was lubricated with the antibiotic solution.  The implant was then transferred from its sterile container to the Zamorano funnel.  The breast pocket was irrigated with Ancef, gentamicin and bacitracin.  The table was lowered.  Using retraction with a McIvor retractor, I inserted the 415 mL Sientra breast implant using the Zamorano funnel and a no-touch technique.  Closure consisted of 3-0 PDS sutures in the muscular layer.  The deep dermal sutures were placed using 4-0 Monocryl suture in buried, interrupted, intracuticular fashion.  The skin was then run using 4-0 Prolene suture in running and buried, continuous, intracuticular fashion.  Dressing consisted of Mastisol and 3-1/2-inch Steri-Strips.  I then injected around the breast with a total of 30 mL of 0.25% Marcaine with 1:200,000 epinephrine for postoperative analgesia.  The patient was placed in a  surgical bra on the operating room table.  Start time was 1438 p.m.  Procedure was completed at 1531 p.m.    Miguel Wang MD        D: 2023   T: 2023   MT: [email or web address]    Name:     LISETTE JANE  MRN:      [id number]        Account:        [id number]   :      1985           Procedure Date: 04/10/2023     Document: [id number]

## 2023-05-21 ENCOUNTER — HEALTH MAINTENANCE LETTER (OUTPATIENT)
Age: 38
End: 2023-05-21

## 2024-05-19 ENCOUNTER — HEALTH MAINTENANCE LETTER (OUTPATIENT)
Age: 39
End: 2024-05-19

## 2025-04-27 ENCOUNTER — HEALTH MAINTENANCE LETTER (OUTPATIENT)
Age: 40
End: 2025-04-27

## 2025-06-08 ENCOUNTER — HEALTH MAINTENANCE LETTER (OUTPATIENT)
Age: 40
End: 2025-06-08

## (undated) DEVICE — NDL 19GA 1.5"

## (undated) DEVICE — BLADE KNIFE SURG 10 371110

## (undated) DEVICE — DECANTER TRANSFER DEVICE 2008S

## (undated) DEVICE — SPONGE LAP 18X18" 1515

## (undated) DEVICE — SPECIMEN CONTAINER 4OZ

## (undated) DEVICE — SUCTION SLEEVE NEPTUNE 2 165MM 0703-005-165

## (undated) DEVICE — GLOVE BIOGEL PI MICRO SZ 7.5 48575

## (undated) DEVICE — DRAIN JACKSON PRATT 15FR ROUND SU130-1323

## (undated) DEVICE — SUCTION CANISTER MEDIVAC LINER 1500ML W/LID 65651-515

## (undated) DEVICE — SU PDS II 3-0 SH 27" Z316H

## (undated) DEVICE — PACK MINOR SBA15MIFSE

## (undated) DEVICE — DRAPE BREAST/CHEST 29420

## (undated) DEVICE — SU PROLENE 6-0 P-1 18" 8697G

## (undated) DEVICE — DRAIN JACKSON PRATT RESERVOIR 100ML SU130-1305

## (undated) DEVICE — DRSG STERI STRIP 1/2X4" R1547

## (undated) DEVICE — PREP CHLORAPREP 26ML TINTED ORANGE  260815

## (undated) DEVICE — ESU NDL COLORADO MICRO 3CM STR N103A

## (undated) DEVICE — DRSG TEGADERM 2 3/8X2 3/4" 1624W

## (undated) DEVICE — SU PROLENE 4-0 PS-2 18" 8682G

## (undated) DEVICE — SOL WATER IRRIG 1000ML BOTTLE 07139-09

## (undated) DEVICE — SU VICRYL 3-0 SH 27" UND J416H

## (undated) DEVICE — SU MONOCRYL 4-0 P-3 18" UND Y494G

## (undated) DEVICE — ESU PENCIL SMOKE EVAC W/ROCKER SWITCH 0703-047-000

## (undated) DEVICE — ADH LIQUID MASTISOL TOPICAL VIAL 2-3ML 0523-48

## (undated) DEVICE — SUCTION TIP YANKAUER W/O VENT K86

## (undated) DEVICE — SYR BULB IRRIG DOVER 60 ML LATEX FREE 67000

## (undated) DEVICE — SYR 50ML LL W/O NDL 309653

## (undated) DEVICE — STPL SKIN 35W 054887

## (undated) DEVICE — ESU ELEC BLADE 6" COATED/INSULATED E1455-6

## (undated) DEVICE — NDL SPINAL 25GA 3.5" QUINCKE 405180

## (undated) RX ORDER — FENTANYL CITRATE 0.05 MG/ML
INJECTION, SOLUTION INTRAMUSCULAR; INTRAVENOUS
Status: DISPENSED
Start: 2023-04-10

## (undated) RX ORDER — PROPOFOL 10 MG/ML
INJECTION, EMULSION INTRAVENOUS
Status: DISPENSED
Start: 2023-03-29

## (undated) RX ORDER — HYDRALAZINE HYDROCHLORIDE 20 MG/ML
INJECTION INTRAMUSCULAR; INTRAVENOUS
Status: DISPENSED
Start: 2023-03-29

## (undated) RX ORDER — GENTAMICIN 40 MG/ML
INJECTION, SOLUTION INTRAMUSCULAR; INTRAVENOUS
Status: DISPENSED
Start: 2023-04-10

## (undated) RX ORDER — CEFAZOLIN SODIUM 1 G/3ML
INJECTION, POWDER, FOR SOLUTION INTRAMUSCULAR; INTRAVENOUS
Status: DISPENSED
Start: 2023-04-10

## (undated) RX ORDER — LIDOCAINE HYDROCHLORIDE AND EPINEPHRINE 10; 10 MG/ML; UG/ML
INJECTION, SOLUTION INFILTRATION; PERINEURAL
Status: DISPENSED
Start: 2023-04-10

## (undated) RX ORDER — GENTAMICIN 40 MG/ML
INJECTION, SOLUTION INTRAMUSCULAR; INTRAVENOUS
Status: DISPENSED
Start: 2023-03-29

## (undated) RX ORDER — ACETAMINOPHEN 325 MG/1
TABLET ORAL
Status: DISPENSED
Start: 2023-03-29

## (undated) RX ORDER — ONDANSETRON 2 MG/ML
INJECTION INTRAMUSCULAR; INTRAVENOUS
Status: DISPENSED
Start: 2023-03-29

## (undated) RX ORDER — GINSENG 100 MG
CAPSULE ORAL
Status: DISPENSED
Start: 2023-04-10

## (undated) RX ORDER — GABAPENTIN 300 MG/1
CAPSULE ORAL
Status: DISPENSED
Start: 2023-03-29

## (undated) RX ORDER — FENTANYL CITRATE 50 UG/ML
INJECTION, SOLUTION INTRAMUSCULAR; INTRAVENOUS
Status: DISPENSED
Start: 2023-03-29

## (undated) RX ORDER — TRIAMCINOLONE ACETONIDE 40 MG/ML
INJECTION, SUSPENSION INTRA-ARTICULAR; INTRAMUSCULAR
Status: DISPENSED
Start: 2023-04-10

## (undated) RX ORDER — OXYCODONE HYDROCHLORIDE 5 MG/1
TABLET ORAL
Status: DISPENSED
Start: 2023-04-10

## (undated) RX ORDER — ONDANSETRON 2 MG/ML
INJECTION INTRAMUSCULAR; INTRAVENOUS
Status: DISPENSED
Start: 2023-04-10

## (undated) RX ORDER — BUPIVACAINE HYDROCHLORIDE AND EPINEPHRINE 2.5; 5 MG/ML; UG/ML
INJECTION, SOLUTION INFILTRATION; PERINEURAL
Status: DISPENSED
Start: 2023-04-10

## (undated) RX ORDER — CEFAZOLIN SODIUM 1 G/3ML
INJECTION, POWDER, FOR SOLUTION INTRAMUSCULAR; INTRAVENOUS
Status: DISPENSED
Start: 2023-03-29

## (undated) RX ORDER — TRIAMCINOLONE ACETONIDE 40 MG/ML
INJECTION, SUSPENSION INTRA-ARTICULAR; INTRAMUSCULAR
Status: DISPENSED
Start: 2023-03-29

## (undated) RX ORDER — FENTANYL CITRATE 50 UG/ML
INJECTION, SOLUTION INTRAMUSCULAR; INTRAVENOUS
Status: DISPENSED
Start: 2023-04-10

## (undated) RX ORDER — ACETAMINOPHEN 325 MG/1
TABLET ORAL
Status: DISPENSED
Start: 2023-04-10

## (undated) RX ORDER — OXYCODONE HYDROCHLORIDE 5 MG/1
TABLET ORAL
Status: DISPENSED
Start: 2023-03-29

## (undated) RX ORDER — BUPIVACAINE HYDROCHLORIDE AND EPINEPHRINE 2.5; 5 MG/ML; UG/ML
INJECTION, SOLUTION INFILTRATION; PERINEURAL
Status: DISPENSED
Start: 2023-03-29

## (undated) RX ORDER — DEXAMETHASONE SODIUM PHOSPHATE 4 MG/ML
INJECTION, SOLUTION INTRA-ARTICULAR; INTRALESIONAL; INTRAMUSCULAR; INTRAVENOUS; SOFT TISSUE
Status: DISPENSED
Start: 2023-04-10

## (undated) RX ORDER — DEXAMETHASONE SODIUM PHOSPHATE 4 MG/ML
INJECTION, SOLUTION INTRA-ARTICULAR; INTRALESIONAL; INTRAMUSCULAR; INTRAVENOUS; SOFT TISSUE
Status: DISPENSED
Start: 2023-03-29